# Patient Record
Sex: MALE | Race: WHITE | Employment: STUDENT | URBAN - NONMETROPOLITAN AREA
[De-identification: names, ages, dates, MRNs, and addresses within clinical notes are randomized per-mention and may not be internally consistent; named-entity substitution may affect disease eponyms.]

---
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
## 2023-02-03 ENCOUNTER — OFFICE VISIT (OUTPATIENT)
Dept: FAMILY MEDICINE CLINIC | Age: 18
End: 2023-02-03

## 2023-02-03 VITALS
OXYGEN SATURATION: 98 % | WEIGHT: 245 LBS | HEIGHT: 70 IN | HEART RATE: 98 BPM | TEMPERATURE: 97.4 F | DIASTOLIC BLOOD PRESSURE: 70 MMHG | BODY MASS INDEX: 35.07 KG/M2 | SYSTOLIC BLOOD PRESSURE: 112 MMHG

## 2023-02-03 DIAGNOSIS — Z00.00 ENCOUNTER FOR WELL ADULT EXAM WITHOUT ABNORMAL FINDINGS: Primary | ICD-10-CM

## 2023-02-03 DIAGNOSIS — F51.01 PRIMARY INSOMNIA: ICD-10-CM

## 2023-02-03 DIAGNOSIS — F32.A DEPRESSION, UNSPECIFIED DEPRESSION TYPE: ICD-10-CM

## 2023-02-03 DIAGNOSIS — F41.9 ANXIETY: ICD-10-CM

## 2023-02-03 RX ORDER — CITALOPRAM 20 MG/1
20 TABLET ORAL DAILY
Qty: 30 TABLET | Refills: 11 | Status: SHIPPED | OUTPATIENT
Start: 2023-02-03

## 2023-02-03 RX ORDER — HYDROXYZINE 50 MG/1
50 TABLET, FILM COATED ORAL NIGHTLY
Qty: 30 TABLET | Refills: 11 | Status: SHIPPED | OUTPATIENT
Start: 2023-02-03 | End: 2023-03-05

## 2023-02-03 SDOH — ECONOMIC STABILITY: INCOME INSECURITY: HOW HARD IS IT FOR YOU TO PAY FOR THE VERY BASICS LIKE FOOD, HOUSING, MEDICAL CARE, AND HEATING?: NOT HARD AT ALL

## 2023-02-03 SDOH — ECONOMIC STABILITY: FOOD INSECURITY: WITHIN THE PAST 12 MONTHS, THE FOOD YOU BOUGHT JUST DIDN'T LAST AND YOU DIDN'T HAVE MONEY TO GET MORE.: NEVER TRUE

## 2023-02-03 SDOH — ECONOMIC STABILITY: HOUSING INSECURITY
IN THE LAST 12 MONTHS, WAS THERE A TIME WHEN YOU DID NOT HAVE A STEADY PLACE TO SLEEP OR SLEPT IN A SHELTER (INCLUDING NOW)?: YES

## 2023-02-03 SDOH — ECONOMIC STABILITY: FOOD INSECURITY: WITHIN THE PAST 12 MONTHS, YOU WORRIED THAT YOUR FOOD WOULD RUN OUT BEFORE YOU GOT MONEY TO BUY MORE.: NEVER TRUE

## 2023-02-03 ASSESSMENT — ENCOUNTER SYMPTOMS
VOMITING: 0
EYE REDNESS: 0
COUGH: 0
SORE THROAT: 0
RHINORRHEA: 0
DIARRHEA: 0
CONSTIPATION: 0
SHORTNESS OF BREATH: 0

## 2023-02-03 ASSESSMENT — PATIENT HEALTH QUESTIONNAIRE - PHQ9
2. FEELING DOWN, DEPRESSED OR HOPELESS: 1
SUM OF ALL RESPONSES TO PHQ QUESTIONS 1-9: 2
1. LITTLE INTEREST OR PLEASURE IN DOING THINGS: 1
SUM OF ALL RESPONSES TO PHQ9 QUESTIONS 1 & 2: 2
SUM OF ALL RESPONSES TO PHQ QUESTIONS 1-9: 2

## 2023-02-03 NOTE — PROGRESS NOTES
Well Adult Note  Name: Dafne Vu Date: 2/3/2023   MRN: [id number] Sex: Male   Age: 21 y.o. Ethnicity: Non- / Non    : 2005 Race: White (non-)      Conner D Beula Koyanagi is here for well adult exam.  History:    \"I would like to get back to 220 pounds. \"    Eyes: Yes  Dentist:  Just went  \"I am getting my wisdom teeth pulled March 10.\"  Labs:  Due  Exercise: No regular exercise  Diet and Nut:   no special diets  MVI:  Not regular  Supplements:  No  Asa: No  Depression:  \"I had been off and on the Celexa for the last few months. \"  \"I have been back on it consistently for the last week. \"  Body Image: Overweight  Fall:  No  Tobacco: No   Substance: No ETOH  Sleep: Good  Cognition: No concerns     Review of Systems   Constitutional:  Negative for chills, fatigue and fever. HENT:  Negative for congestion, ear pain, rhinorrhea and sore throat. Eyes:  Negative for redness. Respiratory:  Negative for cough and shortness of breath. Cardiovascular:  Negative for chest pain. Gastrointestinal:  Negative for constipation, diarrhea and vomiting. Skin:  Negative for rash. Neurological:  Negative for dizziness and headaches. Psychiatric/Behavioral:  Positive for decreased concentration. The patient is nervous/anxious. No Known Allergies      Prior to Visit Medications    Medication Sig Taking? Authorizing Provider   hydrOXYzine HCl (ATARAX) 50 MG tablet Take 1 tablet by mouth nightly Yes MYKEL Orta   citalopram (CELEXA) 20 MG tablet Take 1 tablet by mouth daily Yes MYKEL Eller       History reviewed. No pertinent past medical history. No past surgical history on file. No family history on file.     Social History     Tobacco Use    Smoking status: Never    Smokeless tobacco: Never   Substance Use Topics    Alcohol use: No    Drug use: No       Objective   /70   Pulse 98   Temp 97.4 °F (36.3 °C) (Temporal)   Ht 5' 10\" (1.778 m)   Wt 245 lb (111.1 kg)   SpO2 98%   BMI 35.15 kg/m²   Wt Readings from Last 3 Encounters:   02/03/23 245 lb (111.1 kg) (>99 %, Z= 2.33)*   09/17/20 (!) 245 lb (111.1 kg) (>99 %, Z= 2.52)*   02/05/20 (!) 219 lb (99.3 kg) (99 %, Z= 2.22)*     * Growth percentiles are based on [facility] (Boys, 2-20 Years) data. There were no vitals filed for this visit. Physical Exam  Vitals reviewed. Constitutional:       Appearance: Normal appearance. He is well-developed and normal weight. HENT:      Head: Normocephalic. Right Ear: Tympanic membrane, ear canal and external ear normal.      Left Ear: Tympanic membrane, ear canal and external ear normal.      Nose: Nose normal.   Eyes:      General:         Right eye: No discharge. Left eye: No discharge. Neck:      Vascular: No carotid bruit. Cardiovascular:      Rate and Rhythm: Normal rate and regular rhythm. Pulmonary:      Effort: Pulmonary effort is normal.      Breath sounds: Normal breath sounds. No wheezing, rhonchi or rales. Abdominal:      General: Bowel sounds are normal.      Palpations: Abdomen is soft. Musculoskeletal:         General: Normal range of motion. Cervical back: Normal range of motion. Lymphadenopathy:      Cervical: No cervical adenopathy. Skin:     General: Skin is dry. Neurological:      General: No focal deficit present. Mental Status: He is alert and oriented to person, place, and time. Mental status is at baseline. Psychiatric:         Mood and Affect: Mood normal.         Behavior: Behavior normal.         Thought Content: Thought content normal.         Judgment: Judgment normal.         Assessment   Plan   1. Encounter for well adult exam without abnormal findings  -     CBC with Auto Differential; Future  -     Comprehensive Metabolic Panel; Future  -     TSH; Future  -     Lipid Panel; Future  2. Primary insomnia  -     hydrOXYzine HCl (ATARAX) 50 MG tablet;  Take 1 tablet by mouth nightly, Disp-30 tablet, R-11Normal  3. Anxiety  -     citalopram (CELEXA) 20 MG tablet; Take 1 tablet by mouth daily, Disp-30 tablet, R-11Normal  4. Depression, unspecified depression type  -     citalopram (CELEXA) 20 MG tablet; Take 1 tablet by mouth daily, Disp-30 tablet, R-11Normal       Personalized Preventive Plan   Current Health Maintenance Status  Immunization History   Administered Date(s) Administered    DTaP/Hep B/IPV (Pediarix) 2003, 2003, 02/03/2004, 08/26/2004, 08/27/2008    HIB PRP-T (ActHIB, Hiberix) 2003, 2003, 02/03/2004, 08/26/2004    HPV Quadrivalent (Gardasil) 08/05/2015, 01/06/2016    Hepatitis A Ped/Adol (Havrix, Vaqta) 05/16/2018, 11/26/2018    Hepatitis B Ped/Adol (Engerix-B, Recombivax HB) 2003, 2003, 2003, 04/28/2014    MMR 08/26/2004, 08/27/2008    Meningococcal MCV4O (Menveo) 01/14/2020    Meningococcal MCV4P (Menactra) 08/05/2015    Pneumococcal Conjugate 13-valent (Balinda Marbin) 2003, 2003    Polio IPV (IPOL) 2003, 2003, 02/03/2004, 08/27/2008    Tdap (Boostrix, Adacel) 08/05/2015    Varicella (Varivax) 08/26/2004, 08/05/2015        Health Maintenance   Topic Date Due    COVID-19 Vaccine (1) Never done    Depression Screen  Never done    HIV screen  Never done    Hepatitis C screen  Never done    Flu vaccine (1) Never done    DTaP/Tdap/Td vaccine (7 - Td or Tdap) 08/05/2025    Hepatitis A vaccine  Completed    Hib vaccine  Completed    HPV vaccine  Completed    Varicella vaccine  Completed    Meningococcal (ACWY) vaccine  Completed    Pneumococcal 0-64 years Vaccine  Aged Out     Recommendations for Preventive Services Due: see orders and patient instructions/AVS.    Return in about 6 months (around 8/3/2023), or if symptoms worsen or fail to improve.

## 2023-02-03 NOTE — PATIENT INSTRUCTIONS
Starting a Weight Loss Plan: Care Instructions  Overview     If you're thinking about losing weight, it can be hard to know where to start. Your doctor can help you set up a weight loss plan that best meets your needs. You may want to take a class on nutrition or exercise, or you could join a weight loss support group. If you have questions about how to make changes to your eating or exercise habits, ask your doctor about seeing a registered dietitian or an exercise specialist.  It can be a big challenge to lose weight. But you don't have to make huge changes at once. Make small changes, and stick with them. When those changes become habit, add a few more changes. If you don't think you're ready to make changes right now, try to pick a date in the future. Make an appointment to see your doctor to discuss whether the time is right for you to start a plan. Follow-up care is a key part of your treatment and safety. Be sure to make and go to all appointments, and call your doctor if you are having problems. It's also a good idea to know your test results and keep a list of the medicines you take. How can you care for yourself at home? Set realistic goals. Many people expect to lose much more weight than is likely. A weight loss of 5% to 10% of your body weight may be enough to improve your health. Get family and friends involved to provide support. Talk to them about why you are trying to lose weight, and ask them to help. They can help by participating in exercise and having meals with you, even if they may be eating something different. Find what works best for you. If you do not have time or do not like to cook, a program that offers meal replacement bars or shakes may be better for you. Or if you like to prepare meals, finding a plan that includes daily menus and recipes may be best.  Ask your doctor about other health professionals who can help you achieve your weight loss goals.   A dietitian can help you make healthy changes in your diet. An exercise specialist or  can help you develop a safe and effective exercise program.  A counselor or psychiatrist can help you cope with issues such as depression, anxiety, or family problems that can make it hard to focus on weight loss. Consider joining a support group for people who are trying to lose weight. Your doctor can suggest groups in your area. Where can you learn more? Go to http://www.woods.com/ and enter U357 to learn more about \"Starting a Weight Loss Plan: Care Instructions. \"  Current as of: August 25, 2022               Content Version: 13.5  © 2006-2022 [employer]. Care instructions adapted under license by [facility] ([facility]). If you have questions about a medical condition or this instruction, always ask your healthcare professional. [address] any warranty or liability for your use of this information. Well Visit, Ages 25 to 72: Care Instructions  Well visits can help you stay healthy. Your doctor has checked your overall health and may have suggested ways to take good care of yourself. Your doctor also may have recommended tests. You can help prevent illness with healthy eating, good sleep, vaccinations, regular exercise, and other steps. Get the tests that you and your doctor decide on. Depending on your age and risks, examples might include screening for diabetes; hepatitis C; HIV; and cervical, breast, lung, and colon cancer. Screening helps find diseases before any symptoms appear. Eat healthy foods. Choose fruits, vegetables, whole grains, lean protein, and low-fat dairy foods. Limit saturated fat and reduce salt. Limit alcohol. Men should have no more than 2 drinks a day. Women should have no more than 1. For some people, no alcohol is the best choice. Exercise. Get at least 30 minutes of exercise on most days of the week. Walking can be a good choice.      Reach and stay at your healthy weight. This will lower your risk for many health problems. Take care of your mental health. Try to stay connected with friends, family, and community, and find ways to manage stress. If you're feeling depressed or hopeless, talk to someone. A counselor can help. If you don't have a counselor, talk to your doctor. Talk to your doctor if you think you may have a problem with alcohol or drug use. This includes prescription medicines and illegal drugs. Avoid tobacco and nicotine: Don't smoke, vape, or chew. If you need help quitting, talk to your doctor. Practice safer sex. Getting tested, using condoms or dental dams, and limiting sex partners can help prevent STIs. Use birth control if it's important to you to prevent pregnancy. Talk with your doctor about your choices and what might be best for you. Prevent problems where you can. Protect your skin from too much sun, wash your hands, brush your teeth twice a day, and wear a seat belt in the car. Where can you learn more? Go to http://www.quiñonez.com/ and enter P072 to learn more about \"Well Visit, Ages 25 to 72: Care Instructions. \"  Current as of: March 9, 2022               Content Version: 13.5  © 6484-6623 [employer]. Care instructions adapted under license by [facility] ([facility]). If you have questions about a medical condition or this instruction, always ask your healthcare professional. [place] [address] any warranty or liability for your use of this information.

## 2023-06-22 ENCOUNTER — OFFICE VISIT (OUTPATIENT)
Dept: FAMILY MEDICINE CLINIC | Age: 18
End: 2023-06-22
Payer: MEDICAID

## 2023-06-22 VITALS
WEIGHT: 250.25 LBS | BODY MASS INDEX: 35.83 KG/M2 | HEIGHT: 70 IN | OXYGEN SATURATION: 97 % | SYSTOLIC BLOOD PRESSURE: 108 MMHG | TEMPERATURE: 97.4 F | HEART RATE: 76 BPM | DIASTOLIC BLOOD PRESSURE: 68 MMHG

## 2023-06-22 DIAGNOSIS — J40 BRONCHITIS: Primary | ICD-10-CM

## 2023-06-22 PROCEDURE — G8417 CALC BMI ABV UP PARAM F/U: HCPCS | Performed by: NURSE PRACTITIONER

## 2023-06-22 PROCEDURE — 99213 OFFICE O/P EST LOW 20 MIN: CPT | Performed by: NURSE PRACTITIONER

## 2023-06-22 PROCEDURE — 1036F TOBACCO NON-USER: CPT | Performed by: NURSE PRACTITIONER

## 2023-06-22 PROCEDURE — G8427 DOCREV CUR MEDS BY ELIG CLIN: HCPCS | Performed by: NURSE PRACTITIONER

## 2023-06-22 RX ORDER — HYDROXYZINE 50 MG/1
50 TABLET, FILM COATED ORAL NIGHTLY
COMMUNITY
Start: 2023-05-30

## 2023-06-22 RX ORDER — AZITHROMYCIN 250 MG/1
250 TABLET, FILM COATED ORAL SEE ADMIN INSTRUCTIONS
Qty: 6 TABLET | Refills: 0 | Status: SHIPPED | OUTPATIENT
Start: 2023-06-22 | End: 2023-06-27

## 2023-06-22 ASSESSMENT — ENCOUNTER SYMPTOMS
CHOKING: 0
EYE DISCHARGE: 0
BLOOD IN STOOL: 0
COUGH: 1
EYE REDNESS: 0
WHEEZING: 0
RHINORRHEA: 1
DIARRHEA: 0
SORE THROAT: 0
CONSTIPATION: 0

## 2023-06-22 NOTE — PROGRESS NOTES
Sheila Izaguirre (:  2005) is a 21 y.o. male,Established patient, here for evaluation of the following chief complaint(s):  Cough (Ongoing for 2 weeks. Patient states [facility] and [facility]. Given steroids and cough medication. Still has cough. Patient states was told at [facility] that lungs were inflamed. )      ASSESSMENT/PLAN:    ICD-10-CM    1. Bronchitis  J40 azithromycin (ZITHROMAX) 250 MG tablet     mometasone-formoterol (DULERA) 100-5 MCG/ACT inhaler          Return if symptoms worsen or fail to improve. SUBJECTIVE/OBJECTIVE:  HPI  Cough (Ongoing for 2 weeks. Patient states [facility] and [facility]. Given steroids and cough medication. Still has cough. Patient states was told at [facility] that lungs were inflamed. )    Review of Systems   Constitutional:  Negative for appetite change and unexpected weight change. HENT:  Positive for rhinorrhea. Negative for congestion, ear pain and sore throat. Eyes:  Negative for discharge and redness. Respiratory:  Positive for cough. Negative for choking and wheezing. Cardiovascular:  Negative for chest pain. Gastrointestinal:  Negative for blood in stool, constipation and diarrhea. Genitourinary:  Negative for decreased urine volume and dysuria. Skin:  Negative for rash. Neurological:  Negative for weakness. Hematological:  Negative for adenopathy. Psychiatric/Behavioral:  Negative for suicidal ideas. /68 (Site: Left Upper Arm, Position: Sitting, Cuff Size: Large Adult)   Pulse 76   Temp 97.4 °F (36.3 °C) (Temporal)   Ht 5' 10\" (1.778 m)   Wt 250 lb 4 oz (113.5 kg)   SpO2 97%   BMI 35.91 kg/m²    Physical Exam  Vitals reviewed. Constitutional:       Appearance: He is well-developed. HENT:      Head: Normocephalic. Eyes:      Conjunctiva/sclera: Conjunctivae normal.   Cardiovascular:      Rate and Rhythm: Normal rate and regular rhythm.       Heart sounds: Normal heart

## 2024-02-05 DIAGNOSIS — F41.9 ANXIETY: ICD-10-CM

## 2024-02-05 DIAGNOSIS — F51.01 PRIMARY INSOMNIA: ICD-10-CM

## 2024-02-05 DIAGNOSIS — F32.A DEPRESSION, UNSPECIFIED DEPRESSION TYPE: ICD-10-CM

## 2024-02-05 RX ORDER — CITALOPRAM 20 MG/1
20 TABLET ORAL DAILY
Qty: 30 TABLET | Refills: 11 | Status: SHIPPED | OUTPATIENT
Start: 2024-02-05

## 2024-02-05 RX ORDER — HYDROXYZINE 50 MG/1
50 TABLET, FILM COATED ORAL NIGHTLY
Qty: 30 TABLET | Refills: 11 | Status: SHIPPED | OUTPATIENT
Start: 2024-02-05

## 2024-02-05 NOTE — TELEPHONE ENCOUNTER
Received fax from pharmacy requesting refill on pts medication(s). Pt was last seen in office on 6/22/2023  and has a follow up scheduled for Visit date not found. Will send request to  Billie Solorzano  for authorization.     Requested Prescriptions     Pending Prescriptions Disp Refills    citalopram (CELEXA) 20 MG tablet [Pharmacy Med Name: citalopram 20 mg tablet] 30 tablet 11     Sig: TAKE ONE TABLET BY MOUTH DAILY    hydrOXYzine HCl (ATARAX) 50 MG tablet [Pharmacy Med Name: hydroxyzine HCl 50 mg tablet] 30 tablet 11     Sig: TAKE ONE TABLET BY MOUTH NIGHTLY

## 2024-04-10 ENCOUNTER — NURSE ONLY (OUTPATIENT)
Dept: FAMILY MEDICINE CLINIC | Age: 19
End: 2024-04-10

## 2024-04-10 DIAGNOSIS — Z13.9 SCREENING DUE: Primary | ICD-10-CM

## 2024-04-10 SDOH — ECONOMIC STABILITY: FOOD INSECURITY: WITHIN THE PAST 12 MONTHS, YOU WORRIED THAT YOUR FOOD WOULD RUN OUT BEFORE YOU GOT MONEY TO BUY MORE.: SOMETIMES TRUE

## 2024-04-10 SDOH — ECONOMIC STABILITY: INCOME INSECURITY: HOW HARD IS IT FOR YOU TO PAY FOR THE VERY BASICS LIKE FOOD, HOUSING, MEDICAL CARE, AND HEATING?: NOT VERY HARD

## 2024-04-10 SDOH — ECONOMIC STABILITY: FOOD INSECURITY: WITHIN THE PAST 12 MONTHS, THE FOOD YOU BOUGHT JUST DIDN'T LAST AND YOU DIDN'T HAVE MONEY TO GET MORE.: SOMETIMES TRUE

## 2024-04-10 SDOH — ECONOMIC STABILITY: HOUSING INSECURITY
IN THE LAST 12 MONTHS, WAS THERE A TIME WHEN YOU DID NOT HAVE A STEADY PLACE TO SLEEP OR SLEPT IN A SHELTER (INCLUDING NOW)?: PATIENT DECLINED

## 2024-04-10 NOTE — PATIENT INSTRUCTIONS
to bring ID/documentation. Contact for information.    [facility]   [address], [place] [address]  [phone]  [facility]  [address] [address]  [phone]  [facility]  [address] [place], [place] [address]  [phone]    [facility]   Typically serve a daily meal and serve as point of contact for Meals on Wheels program  [facility]  [address], [place] [address]   [phone]    Food Pantry  Food distribution. Most require person to bring ID/documentation. Contact for information.    [facility]  [address], [place] [address]  [phone]  [facility]  [address] [place], [place] [address]   [facility]  [address], [place] [address]  [phone]    [facility]  Typically serve a daily lunch during the week- contact for meal times.  [facility]   [address], [place] [address]  [phone]    [facility]   Typically serve a daily meal and serve as point of contact for Meals on Wheels program  [facility]  [address], [place] [address]  [phone]    Food Pantry  Food distribution. Most require person to bring ID/documentation. Contact for information.    [facility]  [address], [place] [address]  [phone]  [facility]  [address], [place] [address]  [phone]  [facility]   [address] [place] [address]  [phone]  [address] [place] [address]  [phone]  [facility]  [address] [place] [address]  [phone]  [facility]   [phone]  [facility]   [phone]    [facility]   Typically serve

## 2024-04-10 NOTE — PROGRESS NOTES
Dhara Myron Social Determinants Of Health (Sdoh) Screening Questionnaire    Question 4/9/2024  5:33 PM CDT - Filed by Patient   How hard is it for you to pay for the very basics like food, housing, medical care, and heating? Not very hard   Within the past 12 months, you worried that your food would run out before you got the money to buy more. Sometimes true   Within the past 12 months, the food you bought just didn’t last and you didn’t have money to get more. Sometimes true   In the past 12 months, has lack of transportation kept you from meetings, work, or from getting things needed for daily living? No   In the last 12 months, was there a time when you did not have a steady place to sleep or slept in a shelter (including now)? Patient declined   Would you like resources for any of these topics? Food